# Patient Record
Sex: FEMALE | Race: BLACK OR AFRICAN AMERICAN | NOT HISPANIC OR LATINO | Employment: OTHER | URBAN - METROPOLITAN AREA
[De-identification: names, ages, dates, MRNs, and addresses within clinical notes are randomized per-mention and may not be internally consistent; named-entity substitution may affect disease eponyms.]

---

## 2022-02-22 ENCOUNTER — TRANSCRIBE ORDERS (OUTPATIENT)
Dept: ADMINISTRATIVE | Facility: HOSPITAL | Age: 52
End: 2022-02-22

## 2022-02-22 DIAGNOSIS — K43.2 INCISIONAL HERNIA, WITHOUT OBSTRUCTION OR GANGRENE: Primary | ICD-10-CM

## 2022-03-04 ENCOUNTER — HOSPITAL ENCOUNTER (OUTPATIENT)
Dept: CT IMAGING | Facility: HOSPITAL | Age: 52
Discharge: HOME | End: 2022-03-04

## 2022-03-04 ENCOUNTER — LAB (OUTPATIENT)
Dept: LAB | Facility: HOSPITAL | Age: 52
End: 2022-03-04

## 2022-03-04 ENCOUNTER — TRANSCRIBE ORDERS (OUTPATIENT)
Dept: LAB | Facility: HOSPITAL | Age: 52
End: 2022-03-04

## 2022-03-04 DIAGNOSIS — K43.2 INCISIONAL HERNIA, WITHOUT OBSTRUCTION OR GANGRENE: ICD-10-CM

## 2022-03-04 DIAGNOSIS — N85.8 DECIDUOMATOSIS: Primary | ICD-10-CM

## 2022-03-04 DIAGNOSIS — N85.8 DECIDUOMATOSIS: ICD-10-CM

## 2022-03-04 LAB — HCG INTACT+B SERPL-ACNC: <0.5 MIU/ML

## 2022-03-04 PROCEDURE — 84702 CHORIONIC GONADOTROPIN TEST: CPT

## 2022-03-04 PROCEDURE — 74176 CT ABD & PELVIS W/O CONTRAST: CPT

## 2022-03-04 PROCEDURE — 36415 COLL VENOUS BLD VENIPUNCTURE: CPT

## 2022-03-14 ENCOUNTER — OFFICE VISIT (OUTPATIENT)
Dept: GYNECOLOGIC ONCOLOGY | Facility: CLINIC | Age: 52
End: 2022-03-14

## 2022-03-14 VITALS
BODY MASS INDEX: 38.51 KG/M2 | DIASTOLIC BLOOD PRESSURE: 89 MMHG | RESPIRATION RATE: 15 BRPM | TEMPERATURE: 97.7 F | OXYGEN SATURATION: 99 % | HEIGHT: 69 IN | HEART RATE: 85 BPM | SYSTOLIC BLOOD PRESSURE: 139 MMHG | WEIGHT: 260 LBS

## 2022-03-14 DIAGNOSIS — N92.0 MENORRHAGIA WITH REGULAR CYCLE: ICD-10-CM

## 2022-03-14 DIAGNOSIS — K43.2 INCISIONAL HERNIA, WITHOUT OBSTRUCTION OR GANGRENE: ICD-10-CM

## 2022-03-14 DIAGNOSIS — D25.0 INTRAMURAL, SUBMUCOUS, AND SUBSEROUS LEIOMYOMA OF UTERUS: Primary | ICD-10-CM

## 2022-03-14 DIAGNOSIS — D25.1 INTRAMURAL, SUBMUCOUS, AND SUBSEROUS LEIOMYOMA OF UTERUS: Primary | ICD-10-CM

## 2022-03-14 DIAGNOSIS — D25.2 INTRAMURAL, SUBMUCOUS, AND SUBSEROUS LEIOMYOMA OF UTERUS: Primary | ICD-10-CM

## 2022-03-14 PROCEDURE — 99204 OFFICE O/P NEW MOD 45 MIN: CPT | Performed by: OBSTETRICS & GYNECOLOGY

## 2022-03-14 RX ORDER — LISINOPRIL 20 MG/1
TABLET ORAL
COMMUNITY

## 2022-03-14 RX ORDER — HYDROXYZINE HYDROCHLORIDE 25 MG/1
TABLET, FILM COATED ORAL AS NEEDED
COMMUNITY

## 2022-03-14 RX ORDER — ESCITALOPRAM OXALATE 20 MG/1
TABLET ORAL
COMMUNITY
Start: 2022-03-08

## 2022-03-14 RX ORDER — CARVEDILOL 25 MG/1
TABLET ORAL
COMMUNITY

## 2022-03-14 RX ORDER — AMLODIPINE BESYLATE 5 MG/1
TABLET ORAL
COMMUNITY

## 2022-03-14 RX ORDER — POTASSIUM CHLORIDE 750 MG/1
CAPSULE, EXTENDED RELEASE ORAL
COMMUNITY
Start: 2022-01-21

## 2022-03-14 RX ORDER — LANOLIN ALCOHOL/MO/W.PET/CERES
1000 CREAM (GRAM) TOPICAL DAILY
COMMUNITY

## 2022-03-14 NOTE — PROGRESS NOTES
Cynthia Gore  [id number]  1970      Reason for visit: Fibroid uterus    Consultation:  Patient is being seen at the request of Dr. Schuster    History of present illness:  The patient is a 51 y.o. year old female who presents today for treatment and evaluation of the above issues.    Patient was referred to Dr. Schuster for evaluation of hernia.  During her work-up, large uterine mass was identified.  On review of Dr. Odom's notes, patient was noted to have a large uterine mass on CT imaging in [place] 2021 with the uterus and associated masses being 18.8 cm in greatest dimension.  She states that she saw gynecologist for this but the decision was made not to perform surgery as she has having some other issues from (gallbladder etc.) and was not particularly symptomatic.  She had been reassured that this was a noncancerous condition.  Repeat CT scan was performed and the mass is now 31 cm in greatest dimension.  Findings are most consistent with fibroid uterus.    Patient denies urinary and dysfunction constipation.  She notes some occasional right-sided pain.  She uses compresses for this.  She uses an abdominal binder.  It was her understanding that her abdomen was distended due to her hernia.  She notes heavy menses, but no intermenstrual bleeding.  She is a massage therapist.  She has questions about treatment.  She did start an aggressive exercise regimen last year with efforts to get healthier and lose weight.    For new patients, [facility] intake form from the day was reviewed and confirmed.    OBGYN History:  She is a  (twins).  She hendrix not use birth control. She does not have a history of abnormal pap smears.  She has undergone regular Pap smears (every 3 to 4 years) long-term and was due in August.  She did not have a Pap smear at that time.      Oncologic History:  Oncology/Hematology History    No history exists.         Past Medical History:   Diagnosis Date   • Gallstones    •  "Primary hypertension    • Uterine fibroid        Past Surgical History:   Procedure Laterality Date   •  SECTION  1985   • CHOLECYSTECTOMY     • ERCP     • HERNIA REPAIR       MEDICATIONS: The current medication list was reviewed with the patient and updated in the EMR this date per the Medical Assistant. Medication dosages and frequencies were confirmed to be accurate.      Allergies:  has No Known Allergies.    Social History:   Social History     Socioeconomic History   • Marital status: Single   Tobacco Use   • Smoking status: Never Smoker   • Smokeless tobacco: Never Used       Family History:    Family History   Problem Relation Age of Onset   • Diabetes Father    • Heart attack Father    • Heart disease Father    • Diabetes Mother    • Hyperlipidemia Mother        Health Maintenance:    Health Maintenance   Topic Date Due   • COLORECTAL CANCER SCREENING  Never done   • ANNUAL PHYSICAL  Never done   • COVID-19 Vaccine (1) Never done   • TDAP/TD VACCINES (1 - Tdap) Never done   • INFLUENZA VACCINE  Never done   • HEPATITIS C SCREENING  Never done   • PAP SMEAR  Never done   • Pneumococcal Vaccine 0-64  Aged Out     Review of Systems  See history of present illness, otherwise not contributory  Physical Exam    Vitals:    22 1344   BP: 139/89   Pulse: 85   Resp: 15   Temp: 97.7 °F (36.5 °C)   TempSrc: Temporal   SpO2: 99%   Weight: 118 kg (260 lb)   Height: 175.3 cm (69\")   PainSc:   2     Body mass index is 38.4 kg/m².    Wt Readings from Last 3 Encounters:   22 118 kg (260 lb)     GENERAL: Alert, well-appearing female appearing her stated age who is in no apparent distress.   HEENT: Sclera anicteric. Head normocephalic, atraumatic. Mucus membranes moist.   NECK: Trachea midline, supple, without masses.  No thyromegaly.   BREASTS: Deferred  CARDIOVASCULAR: Normal rate, regular rhythm, no murmurs, rubs, or gallops.  No peripheral edema.  RESPIRATORY: Clear to auscultation bilaterally, " normal respiratory effort  BACK:  No CVA tenderness, no vertebral tenderness on palpation  GASTROINTESTINAL:  Abdomen is soft, non-tender, non-distended, no rebound or guarding.  Palpable mass extending to the upper abdomen.  Firm.  Mobile.  Suggestive of large pedunculated fibroid.  Incisional/umbilical hernia approximately 4 cm on palpation.  No incarceration or tenderness noted.  SKIN:  Warm, dry, well-perfused.  All visible areas intact.  No rashes, lesions, ulcers.  PSYCHIATRIC: AO x3, with appropriate affect, normal thought processes.  NEUROLOGIC: No focal deficits.  Moves extremities well.  MUSCULOSKELETAL: Normal gait and station.   EXTREMITIES:   No cyanosis, clubbing, symmetric.  LYMPHATICS:  No cervical or inguinal adenopathy noted.     PELVIC exam:    External genitalia are free from lesion. On speculum examination, the cervix had an area of erythema consistent with a thin cyst.. On bimanual examination no mass was appreciated.  Uterus was normal in size and shape. There is no cervical motion or uterine tenderness. No cervical mass was palpated. Parametria were smooth. Rectovaginal exam was deferred.     ECOG PS 0    PROCEDURES:  none    Diagnostic Data:      CT Abdomen Pelvis Without Contrast    Result Date: 3/4/2022  There is a broad-based shallow 4 cm fascial defect in the upper abdomen compatible with provided history of suspected hernia. The small hernia sac contains only fat and appears at most minimally inflamed.  Pronounced solid masslike enlargement of the uterus measuring 31 cm craniocaudal extending into the upper abdomen, likely compatible with provided history of suspected extensive fibroids. Consider gynecologic consultation if not already obtained.  This report was finalized on 3/4/2022 3:08 PM by Justice Beach.        No results found for: WBC, HGB, HCT, MCV, PLT, NEUTROABS, GLUCOSE, BUN, CREATININE, EGFRIFNONA, EGFRIFAFRI, NA, K, CL, CO2, MG, PHOS, CALCIUM, ALBUMIN, AST, ALT,  BILITOT  No results found for:         Assessment/Plan   This is a 51 y.o. woman with dramatically enlarged fibroid uterus.  Encounter Diagnoses   Name Primary?   • Intramural, submucous, and subserous leiomyoma of uterus Yes   • Menorrhagia with regular cycle    • Incisional hernia, without obstruction or gangrene      At the patient's request, I reviewed the images with her.  I showed her the fascial defect which is periumbilical and consistent with a hernia.  She does have some rectus diastases in the upper abdomen which is exacerbated by the mass-effect from the dramatically enlarged fibroid uterus.  There is a small calcification of the spleen consistent with a calcified granuloma.  There were no concerning findings from a cancer standpoint, although the uterus was enlarged..  I also reassured patient that this is most likely a noncancerous process, despite increase in size.  We discussed that fibroid tumors are hormonally responsive and given her premenopausal age this is still most likely a benign process.  We discussed that individuals of  descent have increased risk of fibroid tumors as well.  We discussed options such as surgery, uterine artery embolization, and hormone management.  I explained why myomectomy is not an appropriate management.  At this point, patient would like to move towards surgery.  She would like to wait until April or May due to her work schedule and upcoming 52th birthday.  I think this is reasonable.    Patient was consented for exploratory laparotomy, total abdominal hysterectomy, bilateral salpingectomy, possible bilateral ovarian preservation.  Patient is going to talk to her sister she is unsure about her sister's past medical history and possible personal history of cancer.  This could potentially impact plan for retaining versus removing ovaries.  In addition, my theoretical plan would be to perform a lower abdominal incision, complete the hysterectomy, and then  extract the uterus followed by the upper abdominal fibroid through her lower abdominal incision.  The goal of this would be to keep the incision small as possible.  However, this may not be successful and incision may need to be extended further.    Risks and benefits of surgery were discussed.  This included, but was not limited to, infection and bleeding like when the skin is cut; damage to surrounding structures; and incisional complications.  Risk of DVT was addressed for major surgeries.  Standard of care efforts to minimize these risks were reviewed.  Typical hospital stay and recovery were discussed as well as post-procedure precautions.  Surgical implications of chronic illnesses on recovery and surgical outcome were reviewed.     Pain medication regimen for postoperative care was discussed.  Typical regimen and avoidance of narcotics was discussed.  Patient was educated that other factors, such as existing narcotic use, can impact postoperative pain management.      Patient verbalized understanding of the plan including the risks and benefits.  Appropriate perioperative testing including laboratory evaluation, EKG as clinically indicated, chest x-ray as clinically indicated, and preadmission evaluation were all ordered as a part of this patient's care.    Pain assessment was performed today as a part of patient’s care.  For patients with pain related to surgery, gynecologic malignancy or cancer treatment, the plan is as noted in the assessment/plan.  For patients with pain not related to these issues, they are to seek any further needed care from a more appropriate provider, such as PCP.      No orders of the defined types were placed in this encounter.      FOLLOW UP: surgery    Electronically Signed by: Barby Hermosillo MD  Date: 3/15/2022

## 2022-03-15 PROBLEM — D25.0 INTRAMURAL, SUBMUCOUS, AND SUBSEROUS LEIOMYOMA OF UTERUS: Status: ACTIVE | Noted: 2022-03-15

## 2022-03-15 PROBLEM — K43.2 INCISIONAL HERNIA, WITHOUT OBSTRUCTION OR GANGRENE: Status: ACTIVE | Noted: 2022-03-15

## 2022-03-15 PROBLEM — D25.2 INTRAMURAL, SUBMUCOUS, AND SUBSEROUS LEIOMYOMA OF UTERUS: Status: ACTIVE | Noted: 2022-03-15

## 2022-03-15 PROBLEM — N92.0 MENORRHAGIA WITH REGULAR CYCLE: Status: ACTIVE | Noted: 2022-03-15

## 2022-03-15 PROBLEM — D25.1 INTRAMURAL, SUBMUCOUS, AND SUBSEROUS LEIOMYOMA OF UTERUS: Status: ACTIVE | Noted: 2022-03-15

## 2022-04-05 ENCOUNTER — OFFICE VISIT (OUTPATIENT)
Dept: OBSTETRICS AND GYNECOLOGY | Facility: CLINIC | Age: 52
End: 2022-04-05

## 2022-04-05 VITALS
RESPIRATION RATE: 16 BRPM | BODY MASS INDEX: 38.25 KG/M2 | SYSTOLIC BLOOD PRESSURE: 128 MMHG | DIASTOLIC BLOOD PRESSURE: 70 MMHG | WEIGHT: 259 LBS

## 2022-04-05 DIAGNOSIS — D25.9 UTERINE LEIOMYOMA, UNSPECIFIED LOCATION: Primary | ICD-10-CM

## 2022-04-05 PROCEDURE — 99203 OFFICE O/P NEW LOW 30 MIN: CPT | Performed by: NURSE PRACTITIONER

## 2022-04-05 NOTE — PROGRESS NOTES
Problem Visit     Patient Name: Cynthia Gore  : 1970   MRN: [id number]   Care Team: Patient Care Team:  Francesca Jauregui APRN as PCP - General (Family Medicine)    Chief Complaint:    Chief Complaint   Patient presents with   • Fibroids       HPI: Cynthia Gore is a 51 y.o. year old  presenting to be seen to discuss uterine fibroid.    She was referred to Dr. Schuster for evaluation of hernia   CT scan revealed large uterine mass   Per Dr. Schuster's notes she was noted to have a large uterine mass on CT imaging in [place] 2021 with the uterus and associated masses being 18.8 cm in greatest dimension.    She states that she saw gynecologist for this but the decision was made not to perform surgery as she has having some other issues from (gallbladder etc.) and was not particularly symptomatic.    She had been reassured that this was a noncancerous condition  Repeat CT scan was performed and the mass is now 31 cm in greatest dimension and findings are most consistent with fibroid uterus  D/t risk of uterine puncture with hernia repair, Dr. Schuster referred the patient to Dr. Hermosillo to discuss fibroid removal   Per Dr. Hermosillo's note, total hysterectomy was discussed with the patient as the option for removing the ? Subserosal fibroid   Ovarian preservation is an option that the patient was still considering     The patient presents today, as this appt was already made prior to the appt with Dr. Hermosillo to further discuss her options   States she really isn't comfortable with hysterectomy   She would prefer only the fibroid be removed - she desires to keep her ovaries as well   She has concerns about immediate menopause if the ovaries are taken and HRT   Also about sexual activity if she has a hysterectomy   She remains relatively asymptomatic   She reports monthly periods with heavy flow - no BTB   No pelvic pain or pressure   No difficulty with urination or bowel mvmts        Subjective      /70   Resp 16   Wt 117 kg (259 lb)   LMP 03/22/2022   Breastfeeding No   BMI 38.25 kg/m²     BMI reviewed: Body mass index is 38.25 kg/m².      Objective     Physical Exam      Neuro: alert and oriented to person, place and time   General:  alert; cooperative; well developed; well nourished   Skin:  Not performed.   Thyroid: not examined   Lungs:  breathing is unlabored   Heart:  Not performed.   Breasts:  Not performed.   Abdomen: Not performed.   Pelvis: Not performed.         Assessment / Plan      Assessment  Problems Addressed This Visit    ICD-10-CM ICD-9-CM   1. Uterine leiomyoma, unspecified location  D25.9 218.9       Plan    In depth discussion with pt in reviewing CT scan and office notes and current POC   She would like a second opinion re: removal of fibroid - she prefers myomectomy over hysterectomy and ovarian preservation   I will consult with our collaborating physicians and call her to discuss what their recommendations are, and if a surgery consult might be appropriate with one of them for a second opinion     45 minutes of face to face was spent talking with the pt addressing her concerns today           Follow Up  Return if symptoms worsen or fail to improve.  Patient was given instructions and counseling regarding her condition or for health maintenance advice. Please see specific information pulled into the AVS if appropriate.     Monik Lanier, MARÍA ELENA  April 5, 2022  15:54 EDT

## 2022-04-26 ENCOUNTER — TELEPHONE (OUTPATIENT)
Dept: GYNECOLOGIC ONCOLOGY | Facility: CLINIC | Age: 52
End: 2022-04-26

## 2022-04-26 NOTE — TELEPHONE ENCOUNTER
Caller: Cynthia Gore    Relationship: Self    Best call back number: [phone]      What was the call regarding: PATIENT CALLED WANTED TO CANCEL HER UPCOMING SURGERY.    Do you require a callback: YES IF ANY QUESTIONS           
Patient taken off surgery list.       
No

## 2022-09-01 PROBLEM — Z01.419 WELL WOMAN EXAM: Status: ACTIVE | Noted: 2022-09-01

## 2022-09-02 ENCOUNTER — OFFICE VISIT (OUTPATIENT)
Dept: OBSTETRICS AND GYNECOLOGY | Facility: CLINIC | Age: 52
End: 2022-09-02

## 2022-09-02 VITALS — RESPIRATION RATE: 14 BRPM | WEIGHT: 256 LBS | BODY MASS INDEX: 37.8 KG/M2

## 2022-09-02 DIAGNOSIS — D25.2 INTRAMURAL, SUBMUCOUS, AND SUBSEROUS LEIOMYOMA OF UTERUS: Primary | ICD-10-CM

## 2022-09-02 DIAGNOSIS — D25.0 INTRAMURAL, SUBMUCOUS, AND SUBSEROUS LEIOMYOMA OF UTERUS: Primary | ICD-10-CM

## 2022-09-02 DIAGNOSIS — D25.1 INTRAMURAL, SUBMUCOUS, AND SUBSEROUS LEIOMYOMA OF UTERUS: Primary | ICD-10-CM

## 2022-09-02 PROCEDURE — 99214 OFFICE O/P EST MOD 30 MIN: CPT | Performed by: OBSTETRICS & GYNECOLOGY

## 2022-09-02 NOTE — PROGRESS NOTES
Subjective   Chief Complaint   Patient presents with   • Pre-op Exam       Cynthia Gore is a 52 y.o. year old .  No LMP recorded.  She comes in consultation as a new patient to discuss uterine fibroids.  She was having issues with hernia.  Abdominal imaging was done.  She ultimately was found to have a very large uterus and was referred to gynecologic oncology.  She saw Dr. Hermosillo who ultimately decided she needed to have a hysterectomy.  Because she did not want to have her uterus removed she hesitated on scheduling surgery.  She recently saw Monik who refers her to have a second opinion regarding possible surgical intervention.    Has not had a Pap smear in at least 3 years time.  Does not have a history of abnormal Pap smears.  Does not have a mammogram within the last several years.  That is currently scheduled.  She has not had a colonoscopy but that is also scheduled currently.    Her last cycle was about 4 months ago.  She has no abdominal pain or discomfort.  She does wear some kind of an abdominal binder mainly for symptomatic relief from an epigastric hernia that appears to be a recurrent.    She never was told she had fibroids prior to this recent CAT scan.    The following portions of the patient's history were reviewed and updated as appropriate:current medications, allergies, past medical history, past social history and past surgical history    Social History    Tobacco Use      Smoking status: Never Smoker      Smokeless tobacco: Never Used         Objective   Resp 14   Wt 116 kg (256 lb)   BMI 37.80 kg/m²     Lab Review   No data reviewed    Imaging   CT of abdomen/pelvis report        Assessment   1. Large pelvic mass most consistent with benign uterine fibroids.  Patient currently asymptomatic  2. Probable menopause based upon absent menses for greater than 4 months time     Plan   1. Given the fact that these are large uterine masses in a patient not previously diagnosed  with fibroids, I do not think myomectomy would be a smart option due to the small possibility of leiomyosarcoma.  I think this is not likely but cannot be excluded  2. If surgery is performed she does need a Pap smear done to make sure cervix is normal assuming hysterectomy is in fact performed  3. Small risk of the hysterectomy needing to be supracervical given the size of the fibroid and the potential for elongation of the cervix has been explained  4. If surgery is performed at this age would recommend salpingo-oophorectomy for prophylaxis given the recent change in the standard recommendations  5. The importance of keeping all planned follow-up and taking all medications as prescribed was emphasized.  6. Follow up after she has a chance to consider these options to decide next steps          This note was electronically signed.    Sterling Reyes M.D.  September 2, 2022    Total time spent today with Cynthia  was 30-39 minutes (level 4) - pathophysiology of her presenting problem along with plans for any diagnositc work-up and treatment.    Part of this note may be an electronic transcription/translation of spoken language to printed text using the Dragon Dictation System.